# Patient Record
Sex: FEMALE | Race: WHITE | NOT HISPANIC OR LATINO | Employment: FULL TIME | URBAN - METROPOLITAN AREA
[De-identification: names, ages, dates, MRNs, and addresses within clinical notes are randomized per-mention and may not be internally consistent; named-entity substitution may affect disease eponyms.]

---

## 2023-04-27 ENCOUNTER — TELEPHONE (OUTPATIENT)
Dept: PRIMARY CARE | Facility: CLINIC | Age: 53
End: 2023-04-27
Payer: COMMERCIAL

## 2023-05-02 DIAGNOSIS — Z00.00 HEALTHCARE MAINTENANCE: ICD-10-CM

## 2023-05-02 DIAGNOSIS — Z12.5 SCREENING FOR PROSTATE CANCER: ICD-10-CM

## 2023-05-02 DIAGNOSIS — R53.83 FATIGUE, UNSPECIFIED TYPE: Primary | ICD-10-CM

## 2023-05-26 ENCOUNTER — LAB (OUTPATIENT)
Dept: LAB | Facility: LAB | Age: 53
End: 2023-05-26
Payer: COMMERCIAL

## 2023-05-26 DIAGNOSIS — Z00.00 HEALTHCARE MAINTENANCE: ICD-10-CM

## 2023-05-26 DIAGNOSIS — R53.83 FATIGUE, UNSPECIFIED TYPE: ICD-10-CM

## 2023-05-26 LAB
BASOPHILS (10*3/UL) IN BLOOD BY AUTOMATED COUNT: 0.04 X10E9/L (ref 0–0.1)
BASOPHILS/100 LEUKOCYTES IN BLOOD BY AUTOMATED COUNT: 0.8 % (ref 0–2)
EOSINOPHILS (10*3/UL) IN BLOOD BY AUTOMATED COUNT: 0.03 X10E9/L (ref 0–0.7)
EOSINOPHILS/100 LEUKOCYTES IN BLOOD BY AUTOMATED COUNT: 0.6 % (ref 0–6)
ERYTHROCYTE DISTRIBUTION WIDTH (RATIO) BY AUTOMATED COUNT: 11.4 % (ref 11.5–14.5)
ERYTHROCYTE MEAN CORPUSCULAR HEMOGLOBIN CONCENTRATION (G/DL) BY AUTOMATED: 33.4 G/DL (ref 32–36)
ERYTHROCYTE MEAN CORPUSCULAR VOLUME (FL) BY AUTOMATED COUNT: 95 FL (ref 80–100)
ERYTHROCYTES (10*6/UL) IN BLOOD BY AUTOMATED COUNT: 4.36 X10E12/L (ref 4–5.2)
HEMATOCRIT (%) IN BLOOD BY AUTOMATED COUNT: 41.3 % (ref 36–46)
HEMOGLOBIN (G/DL) IN BLOOD: 13.8 G/DL (ref 12–16)
IMMATURE GRANULOCYTES/100 LEUKOCYTES IN BLOOD BY AUTOMATED COUNT: 0.2 % (ref 0–0.9)
LEUKOCYTES (10*3/UL) IN BLOOD BY AUTOMATED COUNT: 5.1 X10E9/L (ref 4.4–11.3)
LYMPHOCYTES (10*3/UL) IN BLOOD BY AUTOMATED COUNT: 2.15 X10E9/L (ref 1.2–4.8)
LYMPHOCYTES/100 LEUKOCYTES IN BLOOD BY AUTOMATED COUNT: 42.1 % (ref 13–44)
MONOCYTES (10*3/UL) IN BLOOD BY AUTOMATED COUNT: 0.38 X10E9/L (ref 0.1–1)
MONOCYTES/100 LEUKOCYTES IN BLOOD BY AUTOMATED COUNT: 7.4 % (ref 2–10)
NEUTROPHILS (10*3/UL) IN BLOOD BY AUTOMATED COUNT: 2.5 X10E9/L (ref 1.2–7.7)
NEUTROPHILS/100 LEUKOCYTES IN BLOOD BY AUTOMATED COUNT: 48.9 % (ref 40–80)
NRBC (PER 100 WBCS) BY AUTOMATED COUNT: 0 /100 WBC (ref 0–0)
PLATELETS (10*3/UL) IN BLOOD AUTOMATED COUNT: 292 X10E9/L (ref 150–450)

## 2023-05-26 PROCEDURE — 80053 COMPREHEN METABOLIC PANEL: CPT

## 2023-05-26 PROCEDURE — 85025 COMPLETE CBC W/AUTO DIFF WBC: CPT

## 2023-05-26 PROCEDURE — 80061 LIPID PANEL: CPT

## 2023-05-26 PROCEDURE — 36415 COLL VENOUS BLD VENIPUNCTURE: CPT

## 2023-05-26 PROCEDURE — 84443 ASSAY THYROID STIM HORMONE: CPT

## 2023-05-26 PROCEDURE — 82306 VITAMIN D 25 HYDROXY: CPT

## 2023-05-27 LAB
ALANINE AMINOTRANSFERASE (SGPT) (U/L) IN SER/PLAS: 19 U/L (ref 7–45)
ALBUMIN (G/DL) IN SER/PLAS: 4.8 G/DL (ref 3.4–5)
ALKALINE PHOSPHATASE (U/L) IN SER/PLAS: 77 U/L (ref 33–110)
ANION GAP IN SER/PLAS: 14 MMOL/L (ref 10–20)
ASPARTATE AMINOTRANSFERASE (SGOT) (U/L) IN SER/PLAS: 20 U/L (ref 9–39)
BILIRUBIN TOTAL (MG/DL) IN SER/PLAS: 0.7 MG/DL (ref 0–1.2)
CALCIDIOL (25 OH VITAMIN D3) (NG/ML) IN SER/PLAS: 30 NG/ML
CALCIUM (MG/DL) IN SER/PLAS: 9.7 MG/DL (ref 8.6–10.6)
CARBON DIOXIDE, TOTAL (MMOL/L) IN SER/PLAS: 31 MMOL/L (ref 21–32)
CHLORIDE (MMOL/L) IN SER/PLAS: 96 MMOL/L (ref 98–107)
CHOLESTEROL (MG/DL) IN SER/PLAS: 221 MG/DL (ref 0–199)
CHOLESTEROL IN HDL (MG/DL) IN SER/PLAS: 82.5 MG/DL
CHOLESTEROL/HDL RATIO: 2.7
CREATININE (MG/DL) IN SER/PLAS: 0.9 MG/DL (ref 0.5–1.05)
GFR FEMALE: 77 ML/MIN/1.73M2
GLUCOSE (MG/DL) IN SER/PLAS: 105 MG/DL (ref 74–99)
LDL: 116 MG/DL (ref 0–99)
POTASSIUM (MMOL/L) IN SER/PLAS: 4 MMOL/L (ref 3.5–5.3)
PROTEIN TOTAL: 7.3 G/DL (ref 6.4–8.2)
SODIUM (MMOL/L) IN SER/PLAS: 137 MMOL/L (ref 136–145)
THYROTROPIN (MIU/L) IN SER/PLAS BY DETECTION LIMIT <= 0.05 MIU/L: 1.56 MIU/L (ref 0.44–3.98)
TRIGLYCERIDE (MG/DL) IN SER/PLAS: 114 MG/DL (ref 0–149)
UREA NITROGEN (MG/DL) IN SER/PLAS: 13 MG/DL (ref 6–23)
VLDL: 23 MG/DL (ref 0–40)

## 2023-05-29 NOTE — PROGRESS NOTES
"Subjective   Patient ID: Jeremy Mackey is a 52 y.o. female who presents for Annual Exam.    HPI   Patient presents for physical exam.      Fam Hx  Mom (70) living,  Dad (72) living, stroke (63), glucose intolerance, HTN, Aortic Stenosis,  1/2 brother () living, healthy  Brother () living, PFO, VSD heart surgery     OB/GYN Dr. Darby  psychiatrist, Dr. Winslow   is an      Exercise walks  ETOH 1-2 glasses wine/dinner  Caffeine 2-3 cups coffee/day  Tobacco denies, quit 16 years ago 10-15 years 2 ppw     Mammogram due 2023  DEXA @ 65  Colonoscopy 2021 Dr. Geronimo due 2031     Patient denies other complaints.   Review of Systems   Constitutional:  Negative for activity change, appetite change, fatigue and fever.   HENT:  Negative for congestion.    Respiratory:  Negative for cough, choking and chest tightness.    Cardiovascular:  Negative for chest pain, palpitations and leg swelling.   Musculoskeletal:  Negative for arthralgias, back pain and gait problem.   Skin:  Negative for color change and pallor.   Neurological:  Negative for dizziness, facial asymmetry, light-headedness and headaches.       Objective   /80   Pulse 78   Temp 36.1 °C (96.9 °F)   Resp 16   Ht 1.6 m (5' 3\")   Wt 74.4 kg (164 lb)   SpO2 99%   BMI 29.05 kg/m²   BSA Body surface area is 1.82 meters squared.      Physical Exam  Constitutional:       General: She is not in acute distress.     Appearance: Normal appearance. She is not toxic-appearing.   HENT:      Head: Normocephalic.      Right Ear: Tympanic membrane, ear canal and external ear normal.      Left Ear: Tympanic membrane, ear canal and external ear normal.      Nose: Nose normal.      Mouth/Throat:      Pharynx: Oropharynx is clear.   Eyes:      Conjunctiva/sclera: Conjunctivae normal.      Pupils: Pupils are equal, round, and reactive to light.   Cardiovascular:      Rate and Rhythm: Normal rate and regular rhythm.      Pulses: Normal pulses.      Heart sounds: " Normal heart sounds.   Pulmonary:      Effort: No respiratory distress.      Breath sounds: No wheezing, rhonchi or rales.   Abdominal:      General: Bowel sounds are normal. There is no distension.      Palpations: Abdomen is soft.      Tenderness: There is no abdominal tenderness.   Musculoskeletal:         General: No swelling or tenderness.      Cervical back: No tenderness.   Skin:     Findings: No lesion or rash.   Neurological:      General: No focal deficit present.      Mental Status: She is alert and oriented to person, place, and time. Mental status is at baseline.      Gait: Gait normal.   Psychiatric:         Mood and Affect: Mood normal.         Behavior: Behavior normal.         Thought Content: Thought content normal.         Judgment: Judgment normal.       Lab on 05/26/2023   Component Date Value Ref Range Status    TSH 05/26/2023 1.56  0.44 - 3.98 mIU/L Final     TSH testing is performed using different testing    methodology at [facility] than at other    [facility]. Direct result comparisons should    only be made within the same method.    Cholesterol 05/26/2023 221 (H)  0 - 199 mg/dL Final    .      AGE      DESIRABLE   BORDERLINE HIGH   HIGH     0-19 Y     0 - 169       170 - 199     >/= 200    20-24 Y     0 - 189       190 - 224     >/= 225         >24 Y     0 - 199       200 - 239     >/= 240   **All ranges are based on fasting samples. Specific   therapeutic targets will vary based on patient-specific   cardiac risk.  .   Pediatric guidelines reference:Pediatrics 2011, 128(S5).   Adult guidelines reference: NCEP ATPIII Guidelines,     JACEK 2001, 258:2486-97  .   Venipuncture immediately after or during the    administration of Metamizole may lead to falsely   low results. Testing should be performed immediately   prior to Metamizole dosing.    HDL 05/26/2023 82.5  mg/dL Final    .      AGE      VERY LOW   LOW     NORMAL    HIGH       0-19 Y       < 35   < 40     40-45      ----    20-24 Y       ----   < 40       >45     ----      >24 Y       ----   < 40     40-60      >60  .    Cholesterol/HDL Ratio 05/26/2023 2.7   Final    REF VALUES  DESIRABLE  < 3.4  HIGH RISK  > 5.0    LDL 05/26/2023 116 (H)  0 - 99 mg/dL Final    .                           NEAR      BORD      AGE      DESIRABLE  OPTIMAL    HIGH     HIGH     VERY HIGH     0-19 Y     0 - 109     ---    110-129   >/= 130     ----    20-24 Y     0 - 119     ---    120-159   >/= 160     ----      >24 Y     0 -  99   100-129  130-159   160-189     >/=190  .    VLDL 05/26/2023 23  0 - 40 mg/dL Final    Triglycerides 05/26/2023 114  0 - 149 mg/dL Final    .      AGE      DESIRABLE   BORDERLINE HIGH   HIGH     VERY HIGH   0 D-90 D    19 - 174         ----         ----        ----  91 D- 9 Y     0 -  74        75 -  99     >/= 100      ----    10-19 Y     0 -  89        90 - 129     >/= 130      ----    20-24 Y     0 - 114       115 - 149     >/= 150      ----         >24 Y     0 - 149       150 - 199    200- 499    >/= 500  .   Venipuncture immediately after or during the    administration of Metamizole may lead to falsely   low results. Testing should be performed immediately   prior to Metamizole dosing.    Glucose 05/26/2023 105 (H)  74 - 99 mg/dL Final    Sodium 05/26/2023 137  136 - 145 mmol/L Final    Potassium 05/26/2023 4.0  3.5 - 5.3 mmol/L Final    Chloride 05/26/2023 96 (L)  98 - 107 mmol/L Final    Bicarbonate 05/26/2023 31  21 - 32 mmol/L Final    Anion Gap 05/26/2023 14  10 - 20 mmol/L Final    Urea Nitrogen 05/26/2023 13  6 - 23 mg/dL Final    Creatinine 05/26/2023 0.90  0.50 - 1.05 mg/dL Final    GFR Female 05/26/2023 77  >90 mL/min/1.73m2 Final     CALCULATIONS OF ESTIMATED GFR ARE PERFORMED   USING THE 2021 CKD-EPI STUDY REFIT EQUATION   WITHOUT THE RACE VARIABLE FOR THE IDMS-TRACEABLE   CREATININE METHODS.    https://jasn.asnjournals.org/content/early/2021/09/22/ASN.1034896828    Calcium 05/26/2023 9.7  8.6 - 10.6  mg/dL Final    Albumin 05/26/2023 4.8  3.4 - 5.0 g/dL Final    Alkaline Phosphatase 05/26/2023 77  33 - 110 U/L Final    Total Protein 05/26/2023 7.3  6.4 - 8.2 g/dL Final    AST 05/26/2023 20  9 - 39 U/L Final    Total Bilirubin 05/26/2023 0.7  0.0 - 1.2 mg/dL Final    ALT (SGPT) 05/26/2023 19  7 - 45 U/L Final     Patients treated with Sulfasalazine may generate    falsely decreased results for ALT.    WBC 05/26/2023 5.1  4.4 - 11.3 x10E9/L Final    nRBC 05/26/2023 0.0  0.0 - 0.0 /100 WBC Final    RBC 05/26/2023 4.36  4.00 - 5.20 x10E12/L Final    Hemoglobin 05/26/2023 13.8  12.0 - 16.0 g/dL Final    Hematocrit 05/26/2023 41.3  36.0 - 46.0 % Final    MCV 05/26/2023 95  80 - 100 fL Final    MCHC 05/26/2023 33.4  32.0 - 36.0 g/dL Final    Platelets 05/26/2023 292  150 - 450 x10E9/L Final    RDW 05/26/2023 11.4 (L)  11.5 - 14.5 % Final    Neutrophils % 05/26/2023 48.9  40.0 - 80.0 % Final    Immature Granulocytes %, Automated 05/26/2023 0.2  0.0 - 0.9 % Final     Immature Granulocyte Count (IG) includes promyelocytes,    myelocytes and metamyelocytes but does not include bands.   Percent differential counts (%) should be interpreted in the   context of the absolute cell counts (cells/L).    Lymphocytes % 05/26/2023 42.1  13.0 - 44.0 % Final    Monocytes % 05/26/2023 7.4  2.0 - 10.0 % Final    Eosinophils % 05/26/2023 0.6  0.0 - 6.0 % Final    Basophils % 05/26/2023 0.8  0.0 - 2.0 % Final    Neutrophils Absolute 05/26/2023 2.50  1.20 - 7.70 x10E9/L Final    Lymphocytes Absolute 05/26/2023 2.15  1.20 - 4.80 x10E9/L Final    Monocytes Absolute 05/26/2023 0.38  0.10 - 1.00 x10E9/L Final    Eosinophils Absolute 05/26/2023 0.03  0.00 - 0.70 x10E9/L Final    Basophils Absolute 05/26/2023 0.04  0.00 - 0.10 x10E9/L Final    Vitamin D, 25-Hydroxy 05/26/2023 30  ng/mL Final    .  DEFICIENCY:         < 20   NG/ML  INSUFFICIENCY:      20-29  NG/ML  SUFFICIENCY:         NG/ML    THIS ASSAY ACCURATELY QUANTIFIES THE SUM  OF  VITAMIN D3, 25-HYDROXY AND VIT D2,25-HYDROXY.     Current Outpatient Medications on File Prior to Visit   Medication Sig Dispense Refill    buPROPion XL (Wellbutrin XL) 150 mg 24 hr tablet Take 1 tablet (150 mg) by mouth once daily.      fluticasone (Flonase) 50 mcg/actuation nasal spray Administer 2 sprays into affected nostril(s) once daily.      lamoTRIgine (LaMICtal) 100 mg tablet once every 24 hours.      levomilnacipran (Fetzima) 80 mg extended release capsule TAKE 1 CAPSULE BY MOUTH EVERY DAY for 30      traZODone (Desyrel) 100 mg tablet once every 24 hours.       No current facility-administered medications on file prior to visit.     No images are attached to the encounter.            Assessment/Plan   Diagnoses and all orders for this visit:  Healthcare maintenance    1. Patient's blood work discussed at this office visit  2. Patient's LDL goal less than 130, current   3. Patient's blood sugar elevated, continue on no added sugar diet  4. Patient's sodium level is back to normal  5. Mammogram due 2023  6. DEXA @ 65  7. Colonoscopy 2021 Dr. Geronimo due 2031  8. Patient to call if questions or concerns

## 2023-05-31 ENCOUNTER — OFFICE VISIT (OUTPATIENT)
Dept: PRIMARY CARE | Facility: CLINIC | Age: 53
End: 2023-05-31
Payer: COMMERCIAL

## 2023-05-31 VITALS
RESPIRATION RATE: 16 BRPM | TEMPERATURE: 96.9 F | WEIGHT: 164 LBS | SYSTOLIC BLOOD PRESSURE: 124 MMHG | BODY MASS INDEX: 29.06 KG/M2 | HEART RATE: 78 BPM | OXYGEN SATURATION: 99 % | DIASTOLIC BLOOD PRESSURE: 80 MMHG | HEIGHT: 63 IN

## 2023-05-31 DIAGNOSIS — Z12.31 ENCOUNTER FOR SCREENING MAMMOGRAM FOR MALIGNANT NEOPLASM OF BREAST: ICD-10-CM

## 2023-05-31 DIAGNOSIS — Z00.00 HEALTHCARE MAINTENANCE: Primary | ICD-10-CM

## 2023-05-31 PROCEDURE — 1036F TOBACCO NON-USER: CPT | Performed by: FAMILY MEDICINE

## 2023-05-31 PROCEDURE — 99396 PREV VISIT EST AGE 40-64: CPT | Performed by: FAMILY MEDICINE

## 2023-05-31 RX ORDER — FLUTICASONE PROPIONATE 50 MCG
2 SPRAY, SUSPENSION (ML) NASAL DAILY
COMMUNITY
Start: 2021-10-15

## 2023-05-31 RX ORDER — TRAZODONE HYDROCHLORIDE 100 MG/1
TABLET ORAL EVERY 24 HOURS
COMMUNITY
Start: 2015-04-07

## 2023-05-31 RX ORDER — BUPROPION HYDROCHLORIDE 150 MG/1
150 TABLET ORAL DAILY
COMMUNITY
Start: 2023-05-15

## 2023-05-31 RX ORDER — LAMOTRIGINE 100 MG/1
TABLET ORAL EVERY 24 HOURS
COMMUNITY
Start: 2017-01-10

## 2023-05-31 ASSESSMENT — PATIENT HEALTH QUESTIONNAIRE - PHQ9
1. LITTLE INTEREST OR PLEASURE IN DOING THINGS: NOT AT ALL
SUM OF ALL RESPONSES TO PHQ9 QUESTIONS 1 AND 2: 0
2. FEELING DOWN, DEPRESSED OR HOPELESS: NOT AT ALL

## 2023-05-31 ASSESSMENT — ENCOUNTER SYMPTOMS
HEADACHES: 0
PALPITATIONS: 0
CHEST TIGHTNESS: 0
DIZZINESS: 0
BACK PAIN: 0
DEPRESSION: 0
APPETITE CHANGE: 0
COLOR CHANGE: 0
FATIGUE: 0
LIGHT-HEADEDNESS: 0
COUGH: 0
LOSS OF SENSATION IN FEET: 0
CHOKING: 0
ARTHRALGIAS: 0
OCCASIONAL FEELINGS OF UNSTEADINESS: 0
FACIAL ASYMMETRY: 0
FEVER: 0
ACTIVITY CHANGE: 0

## 2023-05-31 NOTE — PATIENT INSTRUCTIONS
1. Patient's blood work discussed at this office visit  2. Patient's LDL goal less than 130, current   3. Patient's blood sugar elevated, continue on no added sugar diet  4. Patient's sodium level is back to normal  5. Mammogram due 2023  6. DEXA @ 65  7. Colonoscopy 2021 Dr. Geronimo due 2031  8. Patient to call if questions or concerns

## 2024-01-10 ENCOUNTER — TELEPHONE (OUTPATIENT)
Dept: PRIMARY CARE | Facility: CLINIC | Age: 54
End: 2024-01-10
Payer: COMMERCIAL

## 2024-01-10 DIAGNOSIS — E55.9 VITAMIN D DEFICIENCY: ICD-10-CM

## 2024-01-10 DIAGNOSIS — Z00.00 HEALTHCARE MAINTENANCE: ICD-10-CM

## 2024-01-11 PROBLEM — R73.02 GLUCOSE INTOLERANCE (IMPAIRED GLUCOSE TOLERANCE): Status: ACTIVE | Noted: 2024-01-11

## 2024-03-01 DIAGNOSIS — R92.8 ABNORMAL MAMMOGRAM OF RIGHT BREAST: ICD-10-CM

## 2024-03-01 NOTE — PROGRESS NOTES
See paper task:  Please inform pt needs additional views please set up for pt.  Pt made aware; I will schedule appt.  Ordered additional views; will fax to [facility]

## 2024-06-03 PROBLEM — N93.9 ABNORMAL UTERINE BLEEDING: Status: ACTIVE | Noted: 2024-06-03

## 2024-06-03 PROBLEM — H71.90 CHOLESTEATOMA: Status: ACTIVE | Noted: 2024-06-03

## 2024-06-03 PROBLEM — R10.32 LEFT LOWER QUADRANT PAIN: Status: ACTIVE | Noted: 2024-06-03

## 2024-06-03 PROBLEM — B02.9 HERPES ZOSTER: Status: ACTIVE | Noted: 2024-06-03

## 2024-06-03 PROBLEM — H91.90 HEARING LOSS: Status: ACTIVE | Noted: 2024-06-03

## 2024-06-03 PROBLEM — H69.93 EUSTACHIAN TUBE DYSFUNCTION, BILATERAL: Status: ACTIVE | Noted: 2024-06-03

## 2024-06-03 PROBLEM — H57.89 RED EYE: Status: ACTIVE | Noted: 2024-06-03

## 2024-06-03 PROBLEM — T78.40XA ALLERGY: Status: ACTIVE | Noted: 2024-06-03

## 2024-06-03 PROBLEM — B02.9 SHINGLES: Status: ACTIVE | Noted: 2024-06-03

## 2024-06-03 PROBLEM — F41.9 ANXIETY: Status: ACTIVE | Noted: 2024-06-03

## 2024-06-03 PROBLEM — L25.5 CONTACT DERMATITIS DUE TO GENUS TOXICODENDRON: Status: ACTIVE | Noted: 2024-06-03

## 2024-06-03 PROBLEM — H90.8 MIXED CONDUCTIVE AND SENSORINEURAL HEARING LOSS: Status: ACTIVE | Noted: 2024-06-03

## 2024-06-03 PROBLEM — H71.92 CHOLESTEATOMA OF LEFT EAR: Status: ACTIVE | Noted: 2024-06-03

## 2024-06-03 PROBLEM — H70.12 CHRONIC MASTOIDITIS OF LEFT SIDE: Status: ACTIVE | Noted: 2024-06-03

## 2024-06-03 PROBLEM — Z86.59 HISTORY OF DEPRESSION: Status: ACTIVE | Noted: 2024-06-03

## 2024-06-03 PROBLEM — H90.12 CONDUCTIVE HEARING LOSS OF LEFT EAR WITH UNRESTRICTED HEARING OF RIGHT EAR: Status: ACTIVE | Noted: 2024-06-03

## 2024-06-03 PROBLEM — H93.19 TINNITUS: Status: ACTIVE | Noted: 2024-06-03

## 2024-06-03 PROBLEM — E87.1 HYPONATREMIA: Status: ACTIVE | Noted: 2024-06-03

## 2024-06-03 PROBLEM — H74.12 ADHESIVE MIDDLE EAR DISEASE OF LEFT SIDE: Status: ACTIVE | Noted: 2024-06-03

## 2024-06-03 PROBLEM — L25.5 RHUS DERMATITIS: Status: ACTIVE | Noted: 2024-06-03

## 2024-06-26 ENCOUNTER — TELEPHONE (OUTPATIENT)
Dept: PRIMARY CARE | Facility: CLINIC | Age: 54
End: 2024-06-26
Payer: COMMERCIAL

## 2024-06-26 DIAGNOSIS — Z00.00 HEALTHCARE MAINTENANCE: ICD-10-CM

## 2024-06-26 DIAGNOSIS — E55.9 VITAMIN D DEFICIENCY: ICD-10-CM

## 2024-08-29 ENCOUNTER — LAB (OUTPATIENT)
Dept: LAB | Facility: LAB | Age: 54
End: 2024-08-29
Payer: COMMERCIAL

## 2024-08-29 DIAGNOSIS — E55.9 VITAMIN D DEFICIENCY: ICD-10-CM

## 2024-08-29 DIAGNOSIS — Z00.00 HEALTHCARE MAINTENANCE: ICD-10-CM

## 2024-08-29 PROCEDURE — 80053 COMPREHEN METABOLIC PANEL: CPT

## 2024-08-29 PROCEDURE — 84443 ASSAY THYROID STIM HORMONE: CPT

## 2024-08-29 PROCEDURE — 85025 COMPLETE CBC W/AUTO DIFF WBC: CPT

## 2024-08-29 PROCEDURE — 36415 COLL VENOUS BLD VENIPUNCTURE: CPT

## 2024-08-29 PROCEDURE — 82306 VITAMIN D 25 HYDROXY: CPT

## 2024-08-29 PROCEDURE — 80061 LIPID PANEL: CPT

## 2024-08-30 LAB
25(OH)D3 SERPL-MCNC: 34 NG/ML (ref 30–100)
ALBUMIN SERPL BCP-MCNC: 4.8 G/DL (ref 3.4–5)
ALP SERPL-CCNC: 81 U/L (ref 33–110)
ALT SERPL W P-5'-P-CCNC: 50 U/L (ref 7–45)
ANION GAP SERPL CALC-SCNC: 16 MMOL/L (ref 10–20)
AST SERPL W P-5'-P-CCNC: 35 U/L (ref 9–39)
BASOPHILS # BLD AUTO: 0.03 X10*3/UL (ref 0–0.1)
BASOPHILS NFR BLD AUTO: 0.6 %
BILIRUB SERPL-MCNC: 0.6 MG/DL (ref 0–1.2)
BUN SERPL-MCNC: 8 MG/DL (ref 6–23)
CALCIUM SERPL-MCNC: 9.3 MG/DL (ref 8.6–10.6)
CHLORIDE SERPL-SCNC: 97 MMOL/L (ref 98–107)
CHOLEST SERPL-MCNC: 204 MG/DL (ref 0–199)
CHOLESTEROL/HDL RATIO: 2.2
CO2 SERPL-SCNC: 29 MMOL/L (ref 21–32)
CREAT SERPL-MCNC: 0.81 MG/DL (ref 0.5–1.05)
EGFRCR SERPLBLD CKD-EPI 2021: 86 ML/MIN/1.73M*2
EOSINOPHIL # BLD AUTO: 0.05 X10*3/UL (ref 0–0.7)
EOSINOPHIL NFR BLD AUTO: 1 %
ERYTHROCYTE [DISTWIDTH] IN BLOOD BY AUTOMATED COUNT: 11.6 % (ref 11.5–14.5)
GLUCOSE SERPL-MCNC: 91 MG/DL (ref 74–99)
HCT VFR BLD AUTO: 39.5 % (ref 36–46)
HDLC SERPL-MCNC: 90.7 MG/DL
HGB BLD-MCNC: 13.7 G/DL (ref 12–16)
IMM GRANULOCYTES # BLD AUTO: 0.01 X10*3/UL (ref 0–0.7)
IMM GRANULOCYTES NFR BLD AUTO: 0.2 % (ref 0–0.9)
LDLC SERPL CALC-MCNC: 94 MG/DL
LYMPHOCYTES # BLD AUTO: 2.02 X10*3/UL (ref 1.2–4.8)
LYMPHOCYTES NFR BLD AUTO: 40.7 %
MCH RBC QN AUTO: 32 PG (ref 26–34)
MCHC RBC AUTO-ENTMCNC: 34.7 G/DL (ref 32–36)
MCV RBC AUTO: 92 FL (ref 80–100)
MONOCYTES # BLD AUTO: 0.39 X10*3/UL (ref 0.1–1)
MONOCYTES NFR BLD AUTO: 7.9 %
NEUTROPHILS # BLD AUTO: 2.46 X10*3/UL (ref 1.2–7.7)
NEUTROPHILS NFR BLD AUTO: 49.6 %
NON HDL CHOLESTEROL: 113 MG/DL (ref 0–149)
NRBC BLD-RTO: 0 /100 WBCS (ref 0–0)
PLATELET # BLD AUTO: 260 X10*3/UL (ref 150–450)
POTASSIUM SERPL-SCNC: 4.4 MMOL/L (ref 3.5–5.3)
PROT SERPL-MCNC: 7 G/DL (ref 6.4–8.2)
RBC # BLD AUTO: 4.28 X10*6/UL (ref 4–5.2)
SODIUM SERPL-SCNC: 138 MMOL/L (ref 136–145)
TRIGL SERPL-MCNC: 95 MG/DL (ref 0–149)
TSH SERPL-ACNC: 0.86 MIU/L (ref 0.44–3.98)
VLDL: 19 MG/DL (ref 0–40)
WBC # BLD AUTO: 5 X10*3/UL (ref 4.4–11.3)

## 2024-09-04 ENCOUNTER — APPOINTMENT (OUTPATIENT)
Dept: PRIMARY CARE | Facility: CLINIC | Age: 54
End: 2024-09-04
Payer: COMMERCIAL

## 2024-09-04 VITALS
HEIGHT: 62 IN | HEART RATE: 93 BPM | DIASTOLIC BLOOD PRESSURE: 68 MMHG | SYSTOLIC BLOOD PRESSURE: 108 MMHG | BODY MASS INDEX: 26.17 KG/M2 | WEIGHT: 142.2 LBS

## 2024-09-04 DIAGNOSIS — H90.8 MIXED CONDUCTIVE AND SENSORINEURAL HEARING LOSS, UNSPECIFIED LATERALITY: Primary | ICD-10-CM

## 2024-09-04 DIAGNOSIS — R73.02 GLUCOSE INTOLERANCE (IMPAIRED GLUCOSE TOLERANCE): ICD-10-CM

## 2024-09-04 DIAGNOSIS — E55.9 VITAMIN D DEFICIENCY: ICD-10-CM

## 2024-09-04 DIAGNOSIS — R74.8 ELEVATED LIVER ENZYMES: ICD-10-CM

## 2024-09-04 PROBLEM — Z86.59 HISTORY OF DEPRESSION: Status: RESOLVED | Noted: 2024-06-03 | Resolved: 2024-09-04

## 2024-09-04 PROBLEM — B02.9 HERPES ZOSTER: Status: RESOLVED | Noted: 2024-06-03 | Resolved: 2024-09-04

## 2024-09-04 PROBLEM — B02.9 SHINGLES: Status: RESOLVED | Noted: 2024-06-03 | Resolved: 2024-09-04

## 2024-09-04 PROBLEM — T78.40XA ALLERGY: Status: RESOLVED | Noted: 2024-06-03 | Resolved: 2024-09-04

## 2024-09-04 PROBLEM — N93.9 ABNORMAL UTERINE BLEEDING: Status: RESOLVED | Noted: 2024-06-03 | Resolved: 2024-09-04

## 2024-09-04 PROBLEM — H91.90 HEARING LOSS: Status: RESOLVED | Noted: 2024-06-03 | Resolved: 2024-09-04

## 2024-09-04 PROBLEM — L25.5 RHUS DERMATITIS: Status: RESOLVED | Noted: 2024-06-03 | Resolved: 2024-09-04

## 2024-09-04 PROBLEM — H57.89 RED EYE: Status: RESOLVED | Noted: 2024-06-03 | Resolved: 2024-09-04

## 2024-09-04 PROBLEM — L25.5 CONTACT DERMATITIS DUE TO GENUS TOXICODENDRON: Status: RESOLVED | Noted: 2024-06-03 | Resolved: 2024-09-04

## 2024-09-04 PROCEDURE — 99396 PREV VISIT EST AGE 40-64: CPT | Performed by: FAMILY MEDICINE

## 2024-09-04 PROCEDURE — 3008F BODY MASS INDEX DOCD: CPT | Performed by: FAMILY MEDICINE

## 2024-09-04 PROCEDURE — 1036F TOBACCO NON-USER: CPT | Performed by: FAMILY MEDICINE

## 2024-09-04 RX ORDER — VILOXAZINE HYDROCHLORIDE 200 MG/1
CAPSULE, EXTENDED RELEASE ORAL
COMMUNITY

## 2024-09-04 ASSESSMENT — ENCOUNTER SYMPTOMS
HEADACHES: 0
DIZZINESS: 0
CHEST TIGHTNESS: 0
ARTHRALGIAS: 0
APPETITE CHANGE: 0
BACK PAIN: 0
FATIGUE: 0
LIGHT-HEADEDNESS: 0
FACIAL ASYMMETRY: 0
COLOR CHANGE: 0
COUGH: 0
PALPITATIONS: 0
CHOKING: 0
FEVER: 0
ACTIVITY CHANGE: 0

## 2024-09-04 NOTE — PROGRESS NOTES
"Subjective   Patient ID: Jeremy Mackey is a 54 y.o. female who presents for Annual Exam.    HPI   Patient presents for physical exam.      Fam Hx  Mom (75) living, lung cancer  Dad ()78 living, stroke (63), glucose intolerance, HTN, Aortic Stenosis,  1/2 brother () living, healthy  Brother () living, PFO, VSD heart surgery     OB/GYN Dr. Darby  psychiatrist, Dr. Winslow   is an      Exercise walks  ETOH 1-2 glasses wine/dinner  Caffeine 2-3 cups coffee/day  Tobacco denies, quit 16 years ago 10-15 years 2 ppw     Mammogram negative 4-2024  DEXA @ 65  Colonoscopy 2021 Dr. Geronimo due 2031     Patient denies other complaints.   Review of Systems   Constitutional:  Negative for activity change, appetite change, fatigue and fever.   HENT:  Negative for congestion.    Respiratory:  Negative for cough, choking and chest tightness.    Cardiovascular:  Negative for chest pain, palpitations and leg swelling.   Musculoskeletal:  Negative for arthralgias, back pain and gait problem.   Skin:  Negative for color change and pallor.   Neurological:  Negative for dizziness, facial asymmetry, light-headedness and headaches.       Objective   /68 (BP Location: Right arm, Patient Position: Sitting)   Pulse 93   Ht 1.581 m (5' 2.25\")   Wt 64.5 kg (142 lb 3.2 oz)   BMI 25.80 kg/m²   BSA Body surface area is 1.68 meters squared.      Physical Exam  Constitutional:       General: She is not in acute distress.     Appearance: Normal appearance. She is not toxic-appearing.   HENT:      Head: Normocephalic.      Right Ear: Tympanic membrane, ear canal and external ear normal.      Left Ear: Tympanic membrane, ear canal and external ear normal.      Nose: Nose normal.      Mouth/Throat:      Pharynx: Oropharynx is clear.   Eyes:      Conjunctiva/sclera: Conjunctivae normal.      Pupils: Pupils are equal, round, and reactive to light.   Cardiovascular:      Rate and Rhythm: Normal rate and regular rhythm.      Pulses: " Normal pulses.      Heart sounds: Normal heart sounds.   Pulmonary:      Effort: No respiratory distress.      Breath sounds: No wheezing, rhonchi or rales.   Abdominal:      General: Bowel sounds are normal. There is no distension.      Palpations: Abdomen is soft.      Tenderness: There is no abdominal tenderness.   Musculoskeletal:         General: No swelling or tenderness.      Cervical back: No tenderness.   Skin:     Findings: No lesion or rash.   Neurological:      General: No focal deficit present.      Mental Status: She is alert and oriented to person, place, and time. Mental status is at baseline.      Gait: Gait normal.   Psychiatric:         Mood and Affect: Mood normal.         Behavior: Behavior normal.         Thought Content: Thought content normal.         Judgment: Judgment normal.       Lab on 08/29/2024   Component Date Value Ref Range Status    WBC 08/29/2024 5.0  4.4 - 11.3 x10*3/uL Final    nRBC 08/29/2024 0.0  0.0 - 0.0 /100 WBCs Final    RBC 08/29/2024 4.28  4.00 - 5.20 x10*6/uL Final    Hemoglobin 08/29/2024 13.7  12.0 - 16.0 g/dL Final    Hematocrit 08/29/2024 39.5  36.0 - 46.0 % Final    MCV 08/29/2024 92  80 - 100 fL Final    MCH 08/29/2024 32.0  26.0 - 34.0 pg Final    MCHC 08/29/2024 34.7  32.0 - 36.0 g/dL Final    RDW 08/29/2024 11.6  11.5 - 14.5 % Final    Platelets 08/29/2024 260  150 - 450 x10*3/uL Final    Neutrophils % 08/29/2024 49.6  40.0 - 80.0 % Final    Immature Granulocytes %, Automated 08/29/2024 0.2  0.0 - 0.9 % Final    Immature Granulocyte Count (IG) includes promyelocytes, myelocytes and metamyelocytes but does not include bands. Percent differential counts (%) should be interpreted in the context of the absolute cell counts (cells/UL).    Lymphocytes % 08/29/2024 40.7  13.0 - 44.0 % Final    Monocytes % 08/29/2024 7.9  2.0 - 10.0 % Final    Eosinophils % 08/29/2024 1.0  0.0 - 6.0 % Final    Basophils % 08/29/2024 0.6  0.0 - 2.0 % Final    Neutrophils Absolute  08/29/2024 2.46  1.20 - 7.70 x10*3/uL Final    Percent differential counts (%) should be interpreted in the context of the absolute cell counts (cells/uL).    Immature Granulocytes Absolute, Au* 08/29/2024 0.01  0.00 - 0.70 x10*3/uL Final    Lymphocytes Absolute 08/29/2024 2.02  1.20 - 4.80 x10*3/uL Final    Monocytes Absolute 08/29/2024 0.39  0.10 - 1.00 x10*3/uL Final    Eosinophils Absolute 08/29/2024 0.05  0.00 - 0.70 x10*3/uL Final    Basophils Absolute 08/29/2024 0.03  0.00 - 0.10 x10*3/uL Final    Glucose 08/29/2024 91  74 - 99 mg/dL Final    Sodium 08/29/2024 138  136 - 145 mmol/L Final    Potassium 08/29/2024 4.4  3.5 - 5.3 mmol/L Final    Chloride 08/29/2024 97 (L)  98 - 107 mmol/L Final    Bicarbonate 08/29/2024 29  21 - 32 mmol/L Final    Anion Gap 08/29/2024 16  10 - 20 mmol/L Final    Urea Nitrogen 08/29/2024 8  6 - 23 mg/dL Final    Creatinine 08/29/2024 0.81  0.50 - 1.05 mg/dL Final    eGFR 08/29/2024 86  >60 mL/min/1.73m*2 Final    Calculations of estimated GFR are performed using the 2021 CKD-EPI Study Refit equation without the race variable for the IDMS-Traceable creatinine methods.  https://jasn.asnjournals.org/content/early/2021/09/22/ASN.0216679461    Calcium 08/29/2024 9.3  8.6 - 10.6 mg/dL Final    Albumin 08/29/2024 4.8  3.4 - 5.0 g/dL Final    Alkaline Phosphatase 08/29/2024 81  33 - 110 U/L Final    Total Protein 08/29/2024 7.0  6.4 - 8.2 g/dL Final    AST 08/29/2024 35  9 - 39 U/L Final    Bilirubin, Total 08/29/2024 0.6  0.0 - 1.2 mg/dL Final    ALT 08/29/2024 50 (H)  7 - 45 U/L Final    Patients treated with Sulfasalazine may generate falsely decreased results for ALT.    Cholesterol 08/29/2024 204 (H)  0 - 199 mg/dL Final          Age      Desirable   Borderline High   High     0-19 Y     0 - 169       170 - 199     >/= 200    20-24 Y     0 - 189       190 - 224     >/= 225         >24 Y     0 - 199       200 - 239     >/= 240   **All ranges are based on fasting samples. Specific    therapeutic targets will vary based on patient-specific   cardiac risk.    Pediatric guidelines reference:Pediatrics 2011, 128(S5).Adult guidelines reference: NCEP ATPIII Guidelines,JACEK 2001, 258:2486-97    Venipuncture immediately after or during the administration of Metamizole may lead to falsely low results. Testing should be performed immediately prior to Metamizole dosing.    HDL-Cholesterol 08/29/2024 90.7  mg/dL Final      Age       Very Low   Low     Normal    High    0-19 Y    < 35      < 40     40-45     ----  20-24 Y    ----     < 40      >45      ----        >24 Y      ----     < 40     40-60      >60      Cholesterol/HDL Ratio 08/29/2024 2.2   Final      Ref Values  Desirable  < 3.4  High Risk  > 5.0    LDL Calculated 08/29/2024 94  <=99 mg/dL Final                                Near   Borderline      AGE      Desirable  Optimal    High     High     Very High     0-19 Y     0 - 109     ---    110-129   >/= 130     ----    20-24 Y     0 - 119     ---    120-159   >/= 160     ----      >24 Y     0 -  99   100-129  130-159   160-189     >/=190      VLDL 08/29/2024 19  0 - 40 mg/dL Final    Triglycerides 08/29/2024 95  0 - 149 mg/dL Final       Age         Desirable   Borderline High   High     Very High   0 D-90 D    19 - 174         ----         ----        ----  91 D- 9 Y     0 -  74        75 -  99     >/= 100      ----    10-19 Y     0 -  89        90 - 129     >/= 130      ----    20-24 Y     0 - 114       115 - 149     >/= 150      ----         >24 Y     0 - 149       150 - 199    200- 499    >/= 500    Venipuncture immediately after or during the administration of Metamizole may lead to falsely low results. Testing should be performed immediately prior to Metamizole dosing.    Non HDL Cholesterol 08/29/2024 113  0 - 149 mg/dL Final          Age       Desirable   Borderline High   High     Very High     0-19 Y     0 - 119       120 - 144     >/= 145    >/= 160    20-24 Y     0 - 149       150 -  189     >/= 190      ----         >24 Y    30 mg/dL above LDL Cholesterol goal      Thyroid Stimulating Hormone 08/29/2024 0.86  0.44 - 3.98 mIU/L Final    Vitamin D, 25-Hydroxy, Total 08/29/2024 34  30 - 100 ng/mL Final     Current Outpatient Medications on File Prior to Visit   Medication Sig Dispense Refill    fluticasone (Flonase) 50 mcg/actuation nasal spray Administer 2 sprays into affected nostril(s) once daily.      lamoTRIgine (LaMICtal) 100 mg tablet once every 24 hours.      levomilnacipran (Fetzima) 80 mg extended release capsule TAKE 1 CAPSULE BY MOUTH EVERY DAY for 30      Qelbree 200 mg capsule,extended release 24hr TAKE 3 CAPSULES BY MOUTH ONCE A DAY AS DIRECTED      traZODone (Desyrel) 100 mg tablet once every 24 hours.      [DISCONTINUED] buPROPion XL (Wellbutrin XL) 150 mg 24 hr tablet Take 1 tablet (150 mg) by mouth once daily.       No current facility-administered medications on file prior to visit.     No images are attached to the encounter.        Assessment/Plan   Diagnoses and all orders for this visit:  Mixed conductive and sensorineural hearing loss, unspecified laterality  Vitamin D deficiency  Glucose intolerance (impaired glucose tolerance)  Elevated liver enzymes  -     Comprehensive metabolic panel; Future      1. Patient's blood work discussed at this office visit  2. Patient's LDL goal less than 130, current LDL 94  3. Patient's blood sugar elevated, continue on no added sugar diet  4. Patient's liver enzyme ALT is slightly elevated we will discuss at this office visit and recheck CMP in 1 month  5. Mammogram negative 4-2024  6. DEXA @ 65  7. Colonoscopy 2021 Dr. Geronimo due 2031  8. Patient to call if questions or concerns

## 2025-01-13 ENCOUNTER — OFFICE VISIT (OUTPATIENT)
Dept: PRIMARY CARE | Facility: CLINIC | Age: 55
End: 2025-01-13
Payer: COMMERCIAL

## 2025-01-13 VITALS
SYSTOLIC BLOOD PRESSURE: 125 MMHG | HEIGHT: 62 IN | DIASTOLIC BLOOD PRESSURE: 83 MMHG | HEART RATE: 78 BPM | BODY MASS INDEX: 28.16 KG/M2 | OXYGEN SATURATION: 99 % | WEIGHT: 153 LBS | TEMPERATURE: 97.9 F

## 2025-01-13 DIAGNOSIS — M79.18: Primary | ICD-10-CM

## 2025-01-13 PROCEDURE — 3008F BODY MASS INDEX DOCD: CPT | Performed by: FAMILY MEDICINE

## 2025-01-13 PROCEDURE — 99214 OFFICE O/P EST MOD 30 MIN: CPT | Performed by: FAMILY MEDICINE

## 2025-01-13 PROCEDURE — 1036F TOBACCO NON-USER: CPT | Performed by: FAMILY MEDICINE

## 2025-01-13 RX ORDER — HYDROXYZINE HYDROCHLORIDE 25 MG/1
1 TABLET, FILM COATED ORAL EVERY 8 HOURS PRN
COMMUNITY
Start: 2024-11-13

## 2025-01-13 RX ORDER — DOXEPIN HYDROCHLORIDE 10 MG/1
10 CAPSULE ORAL NIGHTLY
COMMUNITY
Start: 2024-12-07

## 2025-01-13 RX ORDER — IBUPROFEN 600 MG/1
600 TABLET ORAL 3 TIMES DAILY PRN
COMMUNITY
Start: 2025-01-13

## 2025-01-13 ASSESSMENT — ENCOUNTER SYMPTOMS
ARTHRALGIAS: 0
HEADACHES: 0
NAUSEA: 0
WEIGHT LOSS: 0
CONSTIPATION: 1
ABDOMINAL PAIN: 1
HEMATURIA: 0
ANOREXIA: 0
FREQUENCY: 0
DIARRHEA: 0
DYSURIA: 0
HEMATOCHEZIA: 0
FEVER: 0
BELCHING: 0
FLATUS: 0
VOMITING: 0
MYALGIAS: 0

## 2025-01-13 ASSESSMENT — CROHNS DISEASE ACTIVITY INDEX (CDAI): CDAI SCORE: 0

## 2025-01-13 NOTE — PROGRESS NOTES
"Subjective   Patient ID: Jeremy Mackey is a 54 y.o. female who presents for Abdominal Pain (Left lower quadrant pain that started Tuesday ).  Today she is accompanied by alone.     Abdominal Pain  This is a new problem. The current episode started in the past 7 days. The onset quality is sudden. The problem occurs constantly. The problem has been waxing and waning. The pain is located in the periumbilical region. The pain is at a severity of 6/10. The pain is moderate. The quality of the pain is sharp. The abdominal pain does not radiate. Associated symptoms include constipation. Pertinent negatives include no anorexia, arthralgias, belching, diarrhea, dysuria, fever, flatus, frequency, headaches, hematochezia, hematuria, melena, myalgias, nausea, vomiting or weight loss. The pain is aggravated by movement. The pain is relieved by Being still and certain positions. She has tried acetaminophen for the symptoms. The treatment provided mild relief. There is no history of abdominal surgery, colon cancer, Crohn's disease, gallstones, GERD, irritable bowel syndrome, pancreatitis, PUD or ulcerative colitis.       Review of Systems   Constitutional:  Negative for fever and weight loss.   Gastrointestinal:  Positive for abdominal pain and constipation. Negative for anorexia, diarrhea, flatus, hematochezia, melena, nausea and vomiting.   Genitourinary:  Negative for dysuria, frequency and hematuria.   Musculoskeletal:  Negative for arthralgias and myalgias.   Neurological:  Negative for headaches.       Objective   /83   Pulse 78   Temp 36.6 °C (97.9 °F) (Oral)   Ht 1.575 m (5' 2\")   Wt 69.4 kg (153 lb)   SpO2 99%   BMI 27.98 kg/m²   BSA: 1.74 meters squared  Growth percentiles: Facility age limit for growth %ravin is 20 years. Facility age limit for growth %ravin is 20 years.     Physical Exam  Vitals and nursing note reviewed.   Constitutional:       General: She is not in acute distress.     Appearance: Normal " appearance. She is not ill-appearing, toxic-appearing or diaphoretic.   HENT:      Head: Normocephalic and atraumatic.      Right Ear: Tympanic membrane, ear canal and external ear normal. There is no impacted cerumen.      Left Ear: Tympanic membrane, ear canal and external ear normal. There is no impacted cerumen.      Nose: No congestion or rhinorrhea.      Mouth/Throat:      Mouth: Mucous membranes are moist.      Pharynx: Oropharynx is clear. No oropharyngeal exudate or posterior oropharyngeal erythema.   Eyes:      General: No scleral icterus.        Right eye: No discharge.         Left eye: No discharge.      Extraocular Movements: Extraocular movements intact.      Conjunctiva/sclera: Conjunctivae normal.      Pupils: Pupils are equal, round, and reactive to light.   Neck:      Vascular: No carotid bruit.   Cardiovascular:      Rate and Rhythm: Normal rate and regular rhythm.      Pulses: Normal pulses.      Heart sounds: Normal heart sounds. No murmur heard.     No friction rub. No gallop.   Pulmonary:      Effort: Pulmonary effort is normal. No respiratory distress.      Breath sounds: Normal breath sounds. No stridor. No wheezing, rhonchi or rales.   Chest:      Chest wall: No tenderness.   Abdominal:      General: Abdomen is flat. Bowel sounds are normal. There is no distension.      Palpations: Abdomen is soft. There is no mass.      Tenderness: There is abdominal tenderness (left abdominal rectus muscle pain 1 inch from umbilicus). There is no right CVA tenderness, left CVA tenderness, guarding or rebound.      Hernia: No hernia is present.   Musculoskeletal:         General: Normal range of motion.      Cervical back: Normal range of motion and neck supple. No rigidity or tenderness.      Right lower leg: No edema.      Left lower leg: No edema.   Lymphadenopathy:      Cervical: No cervical adenopathy.   Skin:     General: Skin is warm and dry.   Neurological:      General: No focal deficit present.       Mental Status: She is alert and oriented to person, place, and time.   Psychiatric:         Mood and Affect: Mood normal.         Behavior: Behavior normal.         Thought Content: Thought content normal.         Judgment: Judgment normal.         Assessment/Plan   Problem List Items Addressed This Visit             ICD-10-CM    Muscular abdominal pain in periumbilical region - Primary M79.18    Relevant Medications    ibuprofen 600 mg tablet       Current Outpatient Medications   Medication Sig Dispense Refill    doxepin (SINEquan) 10 mg capsule Take 1 capsule (10 mg) by mouth once daily at bedtime.      fluticasone (Flonase) 50 mcg/actuation nasal spray Administer 2 sprays into affected nostril(s) once daily.      hydrOXYzine HCL (Atarax) 25 mg tablet Take 1 tablet (25 mg) by mouth every 8 hours if needed for anxiety.      lamoTRIgine (LaMICtal) 100 mg tablet once every 24 hours.      levomilnacipran (Fetzima) 80 mg extended release capsule TAKE 1 CAPSULE BY MOUTH EVERY DAY for 30      Qelbree 200 mg capsule,extended release 24hr TAKE 3 CAPSULES BY MOUTH ONCE A DAY AS DIRECTED      traZODone (Desyrel) 100 mg tablet once every 24 hours.      ibuprofen 600 mg tablet Take 1 tablet (600 mg) by mouth 3 times a day as needed for mild pain (1 - 3) (pain).       No current facility-administered medications for this visit.     Call if no improvement  F/U as needed in 3-5 days     I personally spent over 50% of a total 30 minutes in counseling and discussion with the patient and coordination of care as described above.

## 2025-01-13 NOTE — PATIENT INSTRUCTIONS
Current Outpatient Medications   Medication Sig Dispense Refill    doxepin (SINEquan) 10 mg capsule Take 1 capsule (10 mg) by mouth once daily at bedtime.      fluticasone (Flonase) 50 mcg/actuation nasal spray Administer 2 sprays into affected nostril(s) once daily.      hydrOXYzine HCL (Atarax) 25 mg tablet Take 1 tablet (25 mg) by mouth every 8 hours if needed for anxiety.      lamoTRIgine (LaMICtal) 100 mg tablet once every 24 hours.      levomilnacipran (Fetzima) 80 mg extended release capsule TAKE 1 CAPSULE BY MOUTH EVERY DAY for 30      Qelbree 200 mg capsule,extended release 24hr TAKE 3 CAPSULES BY MOUTH ONCE A DAY AS DIRECTED      traZODone (Desyrel) 100 mg tablet once every 24 hours.      ibuprofen 600 mg tablet Take 1 tablet (600 mg) by mouth 3 times a day as needed for mild pain (1 - 3) (pain).       No current facility-administered medications for this visit.

## 2025-03-07 NOTE — PROGRESS NOTES
Reason for Consult:  Follow-up and Hearing Loss (Left ear.)     Subjective   History Of Present Illness:  Jeremy Mackey is a 54 y.o. female with who has had a long history of bilateral hearing loss, left worse than right. At last visit, this was noted by family members. She was evaluated by Dr. Rosales who noticed a forming cholesteatoma with a retraction pocket on the left side. For that she was referred to me for evaluation and treatment. She is s/p endoscopic tympanoplasty with cartilage graft on 12/18/2021.     During surgery, I encountered:  1. Left posterior-inferior retraction pocket with forming cholesteatoma   resected from the retrotympanum. Cholesteatoma fully removed.   2. Ossicular chain intact with mobile footplate   3. Well aerated middle ear with ventilation system intact   4. Chorda intact   5. Mucosa normal     Since surgery, she has been doing well. She continues to experience a bit of muffled hearing on the left side. She noticed when laying on her right she cannot hear as well.      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    Surgical History:  She has a past surgical history that includes Back surgery (06/25/2014); Other surgical history (05/27/2022); Other surgical history (10/15/2021); and Other surgical history (10/15/2021).     Social History:  She reports that she has never smoked. She has never been exposed to tobacco smoke. She has never used smokeless  "tobacco. She reports current alcohol use of about 7.0 standard drinks of alcohol per week. She reports that she does not use drugs.    Family History:  family history includes Lung cancer in her mother; No Known Problems in her father.     Medications:  Current Outpatient Medications   Medication Instructions    doxepin (SINEQUAN) 10 mg, Nightly    fluticasone (Flonase) 50 mcg/actuation nasal spray 2 sprays, Daily    hydrOXYzine HCL (Atarax) 25 mg tablet 1 tablet, Every 8 hours PRN    ibuprofen 600 mg, oral, 3 times daily PRN    lamoTRIgine (LaMICtal) 100 mg tablet Every 24 hours    levomilnacipran (Fetzima) 80 mg extended release capsule TAKE 1 CAPSULE BY MOUTH EVERY DAY for 30    Qelbree 200 mg capsule,extended release 24hr TAKE 3 CAPSULES BY MOUTH ONCE A DAY AS DIRECTED    traZODone (Desyrel) 100 mg tablet Every 24 hours      Allergies:  Patient has no known allergies.    Review of Systems:   A comprehensive 10-point review of systems was obtained including constitutional, neurological, HEENT, pulmonary, cardiovascular, genito-urinary, and other pertinent systems and was negative except as noted in the HPI.     Objective   Physical Exam:  Last Recorded Vitals: Height 1.6 m (5' 3\"), weight 68 kg (150 lb).    On physical exam, the patient is a well-nourished, well-developed patient, in no acute distress, able to communicate without assistance in English language. Head and face is atraumatic and normocephalic. Salivary glands are intact. Facial strength is symmetrical bilaterally.       On ear examination:  Right ear: The patient has an open and patent ear canal. The tympanic membrane is intact.  AC>BC  Left ear: The patient has an open and patent ear canal. The neotympanum is intact with a cartilage graft in the posterior quadrant.  AC>BC  The Munroe is midline    On vestibular exam, the patient has no spontaneous nystagmus, no headshake nystagmus, no head-thrust nystagmus, and no nystagmus on hyperventilation or " Valsalva maneuvers. [place]-Hallpike maneuver is negative bilaterally.       On neuro exam, the patient is alert and oriented x3, cranial nerves are grossly intact, cerebellar exam is normal.      The rest of the exam, including anterior rhinoscopy, oropharyngeal exam, neck exam, and cardiovascular exam, were normal including no palpable lymphadenopathies, thyroid in the midline position, normal pulses, and normal chest excursion.       Reviewed Results:  Audiology Testing:   I personally reviewed the audiogram from 03/2025 that showed:  Right ear: normal hearing  Type A tympanogram . Discrimination: 100%  Left ear: mild mixed hearing loss Type C tympanogram . Discrimination: 100%      I reviewed and interpreted the patient's audiogram 4/20/22, which shows moderate conductive hearing loss on the left side with 10 dB of air-bone gap and on the right side she has normal hearing. She has 100% discrimination bilaterally.      Imaging:  None     Procedure:  None    Assessment/Plan     1. History of cholesteatoma    2. Sensorineural hearing loss (SNHL) of left ear with unrestricted hearing of right ear        In summary, Jeremy Mackey is a 54 y.o. female with  bilateral adhesive middle ear disease, worse on left than right, with a forming cholesteatoma and retraction pocket over the IS joint on the left side with associated conductive hearing loss.     She is s/p left-sided endoscopic tympanoplasty with cartilage graft on 12/2021. Since surgery, she has been doing well. There is a little bit of a discrepancy between the right and left primarily sensorineural that has remained stable for several years. Follow up as needed if there is significant decline in hearing.        ____________________________________________________  Morgan Salazar MD  Professor and Chief   Otology/Neurotology/Lateral Skull-Base Surgery   [facility]    Scribe Attestation  By signing my name below, Deborah CALDERON  Sylvia Marmolejo   attest that this documentation has been prepared under the direction and in the presence of Morgan Garnett MD.

## 2025-03-10 ENCOUNTER — CLINICAL SUPPORT (OUTPATIENT)
Dept: AUDIOLOGY | Facility: CLINIC | Age: 55
End: 2025-03-10
Payer: COMMERCIAL

## 2025-03-10 ENCOUNTER — APPOINTMENT (OUTPATIENT)
Dept: OTOLARYNGOLOGY | Facility: CLINIC | Age: 55
End: 2025-03-10
Payer: COMMERCIAL

## 2025-03-10 VITALS — WEIGHT: 150 LBS | BODY MASS INDEX: 26.58 KG/M2 | HEIGHT: 63 IN

## 2025-03-10 DIAGNOSIS — H90.72 MIXED CONDUCTIVE AND SENSORINEURAL HEARING LOSS OF LEFT EAR WITH UNRESTRICTED HEARING OF RIGHT EAR: Primary | ICD-10-CM

## 2025-03-10 DIAGNOSIS — Z86.69 HISTORY OF CHOLESTEATOMA: Primary | ICD-10-CM

## 2025-03-10 DIAGNOSIS — H90.42 SENSORINEURAL HEARING LOSS (SNHL) OF LEFT EAR WITH UNRESTRICTED HEARING OF RIGHT EAR: ICD-10-CM

## 2025-03-10 PROCEDURE — 92557 COMPREHENSIVE HEARING TEST: CPT | Performed by: AUDIOLOGIST

## 2025-03-10 PROCEDURE — 3008F BODY MASS INDEX DOCD: CPT | Performed by: OTOLARYNGOLOGY

## 2025-03-10 PROCEDURE — 92550 TYMPANOMETRY & REFLEX THRESH: CPT | Mod: 52 | Performed by: AUDIOLOGIST

## 2025-03-10 PROCEDURE — 99213 OFFICE O/P EST LOW 20 MIN: CPT | Performed by: OTOLARYNGOLOGY

## 2025-03-10 ASSESSMENT — PATIENT HEALTH QUESTIONNAIRE - PHQ9
SUM OF ALL RESPONSES TO PHQ9 QUESTIONS 1 AND 2: 0
1. LITTLE INTEREST OR PLEASURE IN DOING THINGS: NOT AT ALL
2. FEELING DOWN, DEPRESSED OR HOPELESS: NOT AT ALL

## 2025-03-10 NOTE — LETTER
March 16, 2025     Sindi Rao DO  [address]  [facility], [address]  [place] [address]    Patient: Jeremy Mackey   YOB: 1970   Date of Visit: 3/10/2025       Dear Dr. Sindi Rao DO:    Thank you for referring Jeremy Mackey to me for evaluation. Below are my notes for this consultation.  If you have questions, please do not hesitate to call me. I look forward to following your patient along with you.       Sincerely,     Morgan Garnett MD      CC: No Recipients  ______________________________________________________________________________________            Reason for Consult:  Follow-up and Hearing Loss (Left ear.)     Subjective  History Of Present Illness:  Jeremy Mackey is a 54 y.o. female with who has had a long history of bilateral hearing loss, left worse than right. At last visit, this was noted by family members. She was evaluated by Dr. Rosales who noticed a forming cholesteatoma with a retraction pocket on the left side. For that she was referred to me for evaluation and treatment. She is s/p endoscopic tympanoplasty with cartilage graft on 12/18/2021.     During surgery, I encountered:  1. Left posterior-inferior retraction pocket with forming cholesteatoma   resected from the retrotympanum. Cholesteatoma fully removed.   2. Ossicular chain intact with mobile footplate   3. Well aerated middle ear with ventilation system intact   4. Chorda intact   5. Mucosa normal     Since surgery, she has been doing well. She continues to experience a bit of muffled hearing on the left side. She noticed when laying on her right she cannot hear as well.      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    Surgical History:  She has a past surgical history that includes Back surgery (06/25/2014); Other surgical history (05/27/2022); Other surgical history (10/15/2021); and Other surgical history (10/15/2021).     Social History:  She reports that she has never smoked. She has never been exposed to tobacco smoke. She has never used smokeless tobacco. She reports current alcohol use of about 7.0 standard drinks of alcohol per week. She reports that she does not use drugs.    Family History:  family history includes Lung cancer in her mother; No Known Problems in her father.     Medications:  Current Outpatient Medications   Medication Instructions   • doxepin (SINEQUAN) 10 mg, Nightly   • fluticasone (Flonase) 50 mcg/actuation nasal spray 2 sprays, Daily   • hydrOXYzine HCL (Atarax) 25 mg tablet 1 tablet, Every 8 hours PRN   • ibuprofen 600 mg, oral, 3 times daily PRN   • lamoTRIgine (LaMICtal) 100 mg tablet Every 24 hours   • levomilnacipran (Fetzima) 80 mg extended release capsule TAKE 1 CAPSULE BY MOUTH EVERY DAY for 30   • Qelbree 200 mg capsule,extended release 24hr TAKE 3 CAPSULES BY MOUTH ONCE A DAY AS DIRECTED   • traZODone (Desyrel) 100 mg tablet Every 24 hours      Allergies:  Patient has no known allergies.    Review of Systems:   A comprehensive 10-point review of systems was obtained including constitutional, neurological, HEENT, pulmonary, cardiovascular, genito-urinary, and other pertinent systems and was negative except as noted in the HPI.     Objective  Physical Exam:  Last Recorded Vitals: Height 1.6 m (5' 3\"), weight 68 kg (150 lb).    On physical exam, the patient is a well-nourished, well-developed patient, in no acute distress, " able to communicate without assistance in English language. Head and face is atraumatic and normocephalic. Salivary glands are intact. Facial strength is symmetrical bilaterally.       On ear examination:  Right ear: The patient has an open and patent ear canal. The tympanic membrane is intact.  AC>BC  Left ear: The patient has an open and patent ear canal. The neotympanum is intact with a cartilage graft in the posterior quadrant.  AC>BC  The Munroe is midline    On vestibular exam, the patient has no spontaneous nystagmus, no headshake nystagmus, no head-thrust nystagmus, and no nystagmus on hyperventilation or Valsalva maneuvers. [place]-Hallpike maneuver is negative bilaterally.       On neuro exam, the patient is alert and oriented x3, cranial nerves are grossly intact, cerebellar exam is normal.      The rest of the exam, including anterior rhinoscopy, oropharyngeal exam, neck exam, and cardiovascular exam, were normal including no palpable lymphadenopathies, thyroid in the midline position, normal pulses, and normal chest excursion.       Reviewed Results:  Audiology Testing:   I personally reviewed the audiogram from 03/2025 that showed:  Right ear: normal hearing  Type A tympanogram . Discrimination: 100%  Left ear: mild mixed hearing loss Type C tympanogram . Discrimination: 100%      I reviewed and interpreted the patient's audiogram 4/20/22, which shows moderate conductive hearing loss on the left side with 10 dB of air-bone gap and on the right side she has normal hearing. She has 100% discrimination bilaterally.      Imaging:  None     Procedure:  None    Assessment/Plan    1. History of cholesteatoma    2. Sensorineural hearing loss (SNHL) of left ear with unrestricted hearing of right ear        In summary, Jeremy Mackey is a 54 y.o. female with  bilateral adhesive middle ear disease, worse on left than right, with a forming cholesteatoma and retraction pocket over the IS joint on the left side with  associated conductive hearing loss.     She is s/p left-sided endoscopic tympanoplasty with cartilage graft on 12/2021. Since surgery, she has been doing well. There is a little bit of a discrepancy between the right and left primarily sensorineural that has remained stable for several years. Follow up as needed if there is significant decline in hearing.        ____________________________________________________  Morgan Salazar MD  Professor and Chief   Otology/Neurotology/Lateral Skull-Base Surgery   [facility]    Scribe Attestation  By signing my name below, I, Deborah Marmolejo, Scribe   attest that this documentation has been prepared under the direction and in the presence of Morgan Garnett MD.

## 2025-03-10 NOTE — PROGRESS NOTES
ADULT AUDIOLOGY EVALUATION    Name:  Jeremy Mackey  :  1970  Age:  54 y.o.  Date of Evaluation:  3/10/2025    Time: 9:30-10:00    HISTORY     Jeremy Mackey, 54 y.o. is seen today for an audiologic evaluation in conjunction with ENT visit with Morgan Salazar MD. Case history information was obtained from the patient, as well as a chart review.    Patient is s/p left-sided endoscopic tympanoplasty with cartilage graft on 2021 with left cholesteatoma removal. Previous audiogram from 2022 revealed normal hearing of the right ear and mild mixed hearing loss of the left ear with excellent word understanding scores bilaterally. Today, patient stated that she has felt a decrease in hearing of her left ear that began last summer. She stated that she will be laying on her right and and she can't hear high pitched sounds like birds chirping. She also reported intermittent tinnitus that she believes is bilateral. Denied any dizziness, aural fullness, recent ear infections, ear pain, and ear drainage.    TEST RESULTS     Otoscopic Evaluation:  Right Ear: Clear ear canal with unremarkable tympanic membrane  Left Ear: Clear ear canal with unremarkable tympanic membrane    Tympanometry:   Evaluation of middle ear function.  Right Ear: Deep, Ad tympanogram with normal ear canal volume, peak pressure and excessive compliance.  Left Ear: Negative, type C tympanogram with normal ear canal volume, negative peak pressure, and normal compliance.    Ipsilateral Acoustic Reflexes:   Measure of auditory and facial nerve pathways via stapedius muscle contraction of the middle ear.  Right Ear: (Ipsilateral) Responses present at 5370-4371 Hz, and absent at 500 and 4000 Hz.  Left Ear:  Could not test due to Type C immittance result.    Pure Tone Audiometry (125-8000 Hz):   Evaluation of hearing sensitivity via air and bone conduction.  Right Ear: Normal hearing sensitivity with mild sensorineural hearing loss notch at  2000 Hz   Left Ear: Mild mixed hearing loss 125-8000 Hz    Speech Audiometry:   Right Ear:    Speech Reception Threshold (SRT) was obtained at 15 dBHL.   Word Recognition scores were excellent (100%) in quiet when words were presented at 55 dBHL using NU-6 word list ordered by difficulty.  Left Ear:    Speech Reception Threshold (SRT) was obtained at 20 dBHL.   Word Recognition scores were excellent (100%) in quiet when words were presented at 60 dBHL using NU-6 word list ordered by difficulty.     Distortion Product Otoacoustic Emissions:  Measurement of responses which are generated by the cochlea when it is simultaneously stimulated by two pure tone frequencies. Present OAEs suggest normal or near cochlear outer hair cell function for corresponding frequency region(s). Absent OAEs with normal middle ear function can be consistent with some degree of hearing loss. Assessment of cochlear outer hair cell function may be impacted by outer or middle ear function.  Right Ear: Did not test.  Left Ear: Did not test.    IMPRESSIONS     Today's test results indicate hypermobile tympanic membrane movement of the right ear and negative pressure of the left ear. Hearing evaluation results revealed normal hearing sensitivity with a mild sensorineural hearing loss notch at 2000 Hz in the right ear and mild mixed hearing loss of the left ear. Results are considered stable with previous audiogram from 4/28/2022. Results and recommendations were discussed with the patient.    RECOMMENDATIONS     Continue medical follow up with PCP/ENT as recommended.  Consider amplification for left mixed hearing loss pending medical clearance and patient's perception of hearing status.  Return for annual hearing evaluation, due 3/2026, or sooner should concerns regarding status of hearing arise.    MARIE Titus.  Audiology Student Extern    Supervised by:  Alexandra Celaya CCC-A  Senior Audiologist         Degree of Hearing  Sensitivity Decibel Range   Within Normal Limits (WNL) 0-25   Mild 26-40   Moderate 41-55   Moderately-Severe 56-70   Severe 71-90   Profound 91+      Key   CNT/DNT Could Not Test/Did Not Test   TM Tympanic Membrane   WNL Within Normal Limits   HA Hearing Aid   SNHL Sensorineural Hearing Loss   CHL Conductive Hearing Loss   NIHL Noise-Induced Hearing Loss   ECV Ear Canal Volume   MLV Monitored Live Voice   PE Pressure Equalizing   MCL Most Comfortable Level

## 2025-03-17 ENCOUNTER — APPOINTMENT (OUTPATIENT)
Dept: OTOLARYNGOLOGY | Facility: CLINIC | Age: 55
End: 2025-03-17
Payer: COMMERCIAL

## 2025-03-17 ENCOUNTER — APPOINTMENT (OUTPATIENT)
Dept: AUDIOLOGY | Facility: CLINIC | Age: 55
End: 2025-03-17
Payer: COMMERCIAL

## 2025-04-25 ENCOUNTER — TELEPHONE (OUTPATIENT)
Dept: PRIMARY CARE | Facility: CLINIC | Age: 55
End: 2025-04-25
Payer: COMMERCIAL

## 2025-04-26 DIAGNOSIS — Z00.00 HEALTHCARE MAINTENANCE: ICD-10-CM

## 2025-04-26 DIAGNOSIS — E55.9 VITAMIN D DEFICIENCY: ICD-10-CM

## 2025-09-08 ENCOUNTER — APPOINTMENT (OUTPATIENT)
Dept: PRIMARY CARE | Facility: CLINIC | Age: 55
End: 2025-09-08
Payer: COMMERCIAL